# Patient Record
Sex: MALE | Race: WHITE | NOT HISPANIC OR LATINO | Employment: PART TIME | URBAN - METROPOLITAN AREA
[De-identification: names, ages, dates, MRNs, and addresses within clinical notes are randomized per-mention and may not be internally consistent; named-entity substitution may affect disease eponyms.]

---

## 2017-05-20 ENCOUNTER — HOSPITAL ENCOUNTER (EMERGENCY)
Facility: OTHER | Age: 37
Discharge: HOME | End: 2017-05-20
Attending: EMERGENCY MEDICINE

## 2017-05-20 VITALS
HEART RATE: 70 BPM | RESPIRATION RATE: 16 BRPM | OXYGEN SATURATION: 99 % | BODY MASS INDEX: 22.21 KG/M2 | DIASTOLIC BLOOD PRESSURE: 92 MMHG | WEIGHT: 164 LBS | TEMPERATURE: 99 F | HEIGHT: 72 IN | SYSTOLIC BLOOD PRESSURE: 143 MMHG

## 2017-05-20 DIAGNOSIS — S02.19XA: ICD-10-CM

## 2017-05-20 DIAGNOSIS — S02.2XXA CLOSED FRACTURE OF NASAL BONE, INITIAL ENCOUNTER: ICD-10-CM

## 2017-05-20 DIAGNOSIS — S02.32XA CLOSED FRACTURE OF LEFT ORBITAL FLOOR, INITIAL ENCOUNTER: ICD-10-CM

## 2017-05-20 DIAGNOSIS — S82.831A CLOSED FRACTURE OF PROXIMAL END OF RIGHT FIBULA, UNSPECIFIED FRACTURE MORPHOLOGY, INITIAL ENCOUNTER: Primary | ICD-10-CM

## 2017-05-20 PROCEDURE — 99285 EMERGENCY DEPT VISIT HI MDM: CPT | Mod: 25

## 2017-05-20 PROCEDURE — 90715 TDAP VACCINE 7 YRS/> IM: CPT | Performed by: EMERGENCY MEDICINE

## 2017-05-20 PROCEDURE — 25000003 PHARM REV CODE 250: Performed by: EMERGENCY MEDICINE

## 2017-05-20 PROCEDURE — 90471 IMMUNIZATION ADMIN: CPT | Performed by: EMERGENCY MEDICINE

## 2017-05-20 PROCEDURE — 63600175 PHARM REV CODE 636 W HCPCS: Performed by: EMERGENCY MEDICINE

## 2017-05-20 RX ORDER — HYDROCODONE BITARTRATE AND ACETAMINOPHEN 5; 325 MG/1; MG/1
1 TABLET ORAL EVERY 8 HOURS PRN
Qty: 20 TABLET | Refills: 0 | Status: SHIPPED | OUTPATIENT
Start: 2017-05-20 | End: 2017-05-30

## 2017-05-20 RX ORDER — IBUPROFEN 600 MG/1
600 TABLET ORAL EVERY 6 HOURS PRN
Qty: 30 TABLET | Refills: 0 | Status: ON HOLD | OUTPATIENT
Start: 2017-05-20 | End: 2020-06-22 | Stop reason: HOSPADM

## 2017-05-20 RX ORDER — KETOROLAC TROMETHAMINE 30 MG/ML
30 INJECTION, SOLUTION INTRAMUSCULAR; INTRAVENOUS
Status: COMPLETED | OUTPATIENT
Start: 2017-05-20 | End: 2017-05-20

## 2017-05-20 RX ORDER — OXYCODONE AND ACETAMINOPHEN 5; 325 MG/1; MG/1
1 TABLET ORAL
Status: COMPLETED | OUTPATIENT
Start: 2017-05-20 | End: 2017-05-20

## 2017-05-20 RX ORDER — CYCLOBENZAPRINE HCL 10 MG
10 TABLET ORAL 3 TIMES DAILY PRN
Qty: 15 TABLET | Refills: 0 | Status: SHIPPED | OUTPATIENT
Start: 2017-05-20 | End: 2017-05-25

## 2017-05-20 RX ORDER — PROPARACAINE HYDROCHLORIDE 5 MG/ML
2 SOLUTION/ DROPS OPHTHALMIC
Status: COMPLETED | OUTPATIENT
Start: 2017-05-20 | End: 2017-05-20

## 2017-05-20 RX ORDER — MUPIROCIN 20 MG/G
OINTMENT TOPICAL 3 TIMES DAILY
Qty: 1 TUBE | Refills: 0 | Status: SHIPPED | OUTPATIENT
Start: 2017-05-20 | End: 2017-05-30

## 2017-05-20 RX ORDER — PROMETHAZINE HYDROCHLORIDE 25 MG/1
25 TABLET ORAL 2 TIMES DAILY
Qty: 15 TABLET | Refills: 0 | Status: ON HOLD | OUTPATIENT
Start: 2017-05-20 | End: 2020-06-22 | Stop reason: HOSPADM

## 2017-05-20 RX ORDER — PROPARACAINE HYDROCHLORIDE 5 MG/ML
1 SOLUTION/ DROPS OPHTHALMIC
Status: DISCONTINUED | OUTPATIENT
Start: 2017-05-20 | End: 2017-05-20

## 2017-05-20 RX ORDER — AMOXICILLIN AND CLAVULANATE POTASSIUM 875; 125 MG/1; MG/1
1 TABLET, FILM COATED ORAL 2 TIMES DAILY
Qty: 20 TABLET | Refills: 0 | Status: SHIPPED | OUTPATIENT
Start: 2017-05-20 | End: 2017-05-30

## 2017-05-20 RX ORDER — CEFAZOLIN SODIUM 1 G/3ML
1 INJECTION, POWDER, FOR SOLUTION INTRAMUSCULAR; INTRAVENOUS
Status: COMPLETED | OUTPATIENT
Start: 2017-05-20 | End: 2017-05-20

## 2017-05-20 RX ADMIN — CLOSTRIDIUM TETANI TOXOID ANTIGEN (FORMALDEHYDE INACTIVATED), CORYNEBACTERIUM DIPHTHERIAE TOXOID ANTIGEN (FORMALDEHYDE INACTIVATED), BORDETELLA PERTUSSIS TOXOID ANTIGEN (GLUTARALDEHYDE INACTIVATED), BORDETELLA PERTUSSIS FILAMENTOUS HEMAGGLUTININ ANTIGEN (FORMALDEHYDE INACTIVATED), BORDETELLA PERTUSSIS PERTACTIN ANTIGEN, AND BORDETELLA PERTUSSIS FIMBRIAE 2/3 ANTIGEN 0.5 ML: 5; 2; 2.5; 5; 3; 5 INJECTION, SUSPENSION INTRAMUSCULAR at 08:05

## 2017-05-20 RX ADMIN — CEFAZOLIN 1 G: 330 INJECTION, POWDER, FOR SOLUTION INTRAMUSCULAR; INTRAVENOUS at 10:05

## 2017-05-20 RX ADMIN — OXYCODONE AND ACETAMINOPHEN 1 TABLET: 5; 325 TABLET ORAL at 08:05

## 2017-05-20 RX ADMIN — NEOMYCIN-BACITRACIN-POLYMYXIN OINT 1 EACH: OINTMENT at 01:05

## 2017-05-20 RX ADMIN — FLUORESCEIN SODIUM 1 STRIP: 1 STRIP OPHTHALMIC at 10:05

## 2017-05-20 RX ADMIN — PROPARACAINE HYDROCHLORIDE 2 DROP: 5 SOLUTION/ DROPS OPHTHALMIC at 10:05

## 2017-05-20 RX ADMIN — KETOROLAC TROMETHAMINE 30 MG: 30 INJECTION, SOLUTION INTRAMUSCULAR at 12:05
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
## 2017-05-20 NOTE — DISCHARGE INSTRUCTIONS
You must follow-up with ENT, orthopedic surgery, and ophthalmology for further evaluation, treatment, and management of your fractures.      Leg Fracture    You have a break (fracture) of the leg. A fracture is treated with a splint, cast, or special boot. It will take at about 4 to 6 weeks for the fracture to heal. If you have a severe injury, you may need surgery to fix it.  Home care  Follow these guidelines when caring for yourself at home:  · You will be given a splint, cast, boot, or other device to keep the injured area from moving. Unless you were told otherwise, use crutches or a walker. Dont put weight on the injured leg until your health care provider says you can do so. (You can rent crutches and a walker at many pharmacies and surgical or orthopedic supply stores.)  · Keep your leg elevated to reduce pain and swelling. When sleeping, put a pillow under the injured leg. When sitting, support the injured leg so it is level with your waist. This is very important during the first 2 days (48 hours).  · Put an ice pack on the injured area. Do this for 20 minutes every 1 to 2 hours the first day for pain relief. You can make an ice pack by wrapping a plastic bag of ice cubes in a thin towel. As the ice melts, be careful that the cast/splint/boot doesnt get wet. You can put the ice pack directly over the splint or cast. Continue using the ice pack 3 to 4 times a day for the next 2 days. Then use the ice pack as needed to ease pain and swelling.  · Keep the cast/splint/boot completely dry at all times. Bathe with your cast/splint/boot out of the water. Protect it with a large plastic bag, rubber-banded at the top end. If a boot or fiberglass cast or splint gets wet, you can dry it with a hair dryer.  · You may use acetaminophen or ibuprofen to control pain, unless another pain medicine was prescribed. If you have chronic liver or kidney disease, talk with your health care provider before using these  medicines. Also talk with your provider if youve had a stomach ulcer or GI bleeding.  · Dont put creams or objects under the cast if you have itching.  Follow-up care  Follow up with your health care provider in 1 week, or as advised. This is to make sure the bone is healing the way it should. If a splint was put on, it may be converted to a cast at your next visit.  If X-rays were taken, a radiologist will look at them. You will be told of any new findings that may affect your care.  When to seek medical advice  Call your health care provider right away if any of these occur:  · The cast cracks  · The plaster cast or splint becomes wet or soft  · The fiberglass cast or splint stays wet for more than 24 hours  · Bad odor from the cast or wound fluid stains the cast  · Tightness or pain under the cast or splint gets worse  · Toes become swollen, cold, blue, numb, or tingly  · You cant move your toes  · Skin around cast becomes red  · Fever of 101ºF (38.3ºC) or higher, or as directed by your health care provider  Date Last Reviewed: 2/15/2015  © 4240-3848 [employer]. [address] [place] [address]. All rights reserved. This information is not intended as a substitute for professional medical care. Always follow your healthcare professional's instructions.        Nose Fracture, with X-Ray  A broken bone, or fracture, of the nose may be a minor crack. Or it may be a major break, with the parts of your nose pushed out of place. A fractured nose causes pain, swelling, and nasal stuffiness. You may have bleeding from your nose. By tomorrow, you may have bruising around your eyes.  A minor fracture will heal in 3 to 4 weeks, with no more treatment needed. A major break that changes the shape of your nose must be treated by a nose specialist, called an ENT (ear, nose, and throat) doctor.The ENT doctor will straighten the bones in your nose. This is called a reduction. Some fractures may need a  reduction as soon as possible, such as when bleeding from the nose won't stop. Otherwise, it is best to wait a few days until the swelling has gone down. The doctor will then be able to easily see when your nose is back in the right position.    Home care  · Use an ice pack on your nose for no more than 15 to 20 minutes at a time. Do this every 1 to 2 hours for the first 24 to 48 hours. Then use the ice as needed to ease pain and swelling. To make an ice pack, put ice cubes in a plastic bag that seals at the top. Wrap the bag in a clean, thin towel or cloth. Never put ice or an ice pack directly on the skin.  · Tell your provider if you are taking aspirin or blood-thinning medicine. These medicines make it more likely that your nose will bleed. Your provider may need to change your dose.  · You may use over-the-counter pain medicine to control pain, unless another medicine was prescribed. If you have chronic liver or kidney disease, talk with your provider before using this medicine.  · Dont drink alcohol or hot liquids for the next 2 days. Alcohol and hot liquids can dilate blood vessels in your nose. This can cause bleeding.  · Dont blow your nose for the first 2 days. Then, do so gently so you don't cause bleeding.  · Dont play contact sports in the next 6 weeks unless you can protect your nose from getting injured again. You can wear a special custom-fitted plastic face mask to protect your nose.  Special note on concussions  If you had any symptoms of a concussion today, dont return to sports or any activity that could result in another head injury.  These are symptoms of a concussion:  · Nausea  · Vomiting  · Dizziness  · Confusion  · Headache  · Memory loss  · Loss of consciousness  Wait until all of your symptoms are gone and your provider says its OK to resume your activity. Having a second head injury before you fully recover from the first one can lead to serious brain injury.  Follow-up care  Follow  up with your healthcare provider, or as advised. If your nose looks crooked after the swelling goes down, call the ENT doctor for an appointment within the next 10 days. Also make an appointment if its still hard to breathe through 1 or both sides of your nose. If you have trouble getting an ENT appointment, call your regular provider.  If the bones are out of place, a reduction should be done 6 to 10 days after the injury. In children, the reduction should be done 3 to 7 days after the injury. After that time, the bones are more difficult to move back into place.  If you had X-rays taken, you will be told of any new findings that may affect your care.  When to seek medical advice  Call your healthcare provider right away if any of these occur:  · Bleeding from your nose even after you have pinched your nostrils together for 15 minutes without stopping  · Swelling, pain, or redness on your face that gets worse  · Fever of 100.4°F (38°C) or above lasting for 24 to 48 hours  · Can't breathe from both sides of your nose after swelling goes down  · Sinus pain  Call 911  Call 911 if you have:  · Repeated vomiting  · Severe headache or dizziness  · Headache or dizziness that gets worse  · Abnormal drowsiness, or unable to wake up as usual  · Confusion or change in behavior or speech  · Convulsion, or seizure  Date Last Reviewed: 12/3/2015  © 5518-5006 [employer]. [address] [place] [address]. All rights reserved. This information is not intended as a substitute for professional medical care. Always follow your healthcare professional's instructions.        Facial Fracture    You have a broken bone, or fracture, in your face. This may be a small crack in the bone. Or it may be a major break, with the bone moved out of place.  Depending on where the break is, you may have pain when you chew. You may also have nasal congestion, sinus pain, and nose bleeding.   During the first 24 hours after  injury, you may have more swelling or bruising where the break is, or around your eyes. A blow to the face strong enough to cause a broken bone may also cause a concussion or more serious brain injury.  Home care  · Use an ice pack on the injured area for no more than 15 to 20 minutes at a time. Do this every 1 to 2 hours for the first 24 to 48 hours. Then use the ice pack as needed to ease pain and swelling. To make an ice pack, put ice cubes in a plastic bag that seals at the top. Wrap the bag in a clean, thin towel or cloth. Never put ice or an ice pack directly on the skin.  · You may use over-the-counter pain medicine to control pain, unless another pain medicine was prescribed. Talk with your provider before using this medicine if you have chronic liver or kidney disease.  · Sleep with your head raised on 2 or more pillows to ease swelling.  · If you have facial pain when eating, dont eat crunchy or chewy foods. A softer diet will be more comfortable for the first 2 to 3 weeks.  · If you were given antibiotics to prevent an infection, take them as directed until you have finished the prescription.  · If your nose bleeds, sit up and lean forward. Pinch your nostrils together for 10 to 15 minutes. If the bleeding doesnt stop, keep pinching your nostrils and call your healthcare provider. Dont blow your nose for 12 hours after the bleeding stops. This will allow a strong blood clot to form. Dont pick your nose.  Special note on concussions  If you had any symptoms of a concussion today, dont return to sports or any activity that could result in another head injury.  These are symptoms of a concussion:  · Nausea  · Vomiting  · Dizziness  · Confusion  · Headache  · Memory loss  · Loss of consciousness  Wait until all of your symptoms are gone and your provider says its OK to resume your activity. Having a second head injury before you fully recover from the first one can lead to serious brain  "injury.  Follow-up care  Follow up with your healthcare provider in 1 week, or as advised. This is to make sure the bone is healing as it should.  If you had X-rays or CT scans taken, you will be told of any new findings that may affect your care.  When to seek medical advice  Call your healthcare provider right away if any of these occur:  · Swelling or pain in your face that gets worse  · Redness, warmth, or pus draining from the injured area  · Fever of 100.4°F (38°C) or above lasting for 24 to 48 hours  · Double vision  Call 911   Call 911 if you have:  · Repeated vomiting  · Severe headache or dizziness  · Headache or dizziness that gets worse  · Abnormal drowsiness, or unable to wake up as usual  · Confusion or change in behavior or speech  · Convulsion, or seizure   Date Last Reviewed: 12/3/2015  © 6018-1952 [employer]. [address] [place] [address]. All rights reserved. This information is not intended as a substitute for professional medical care. Always follow your healthcare professional's instructions.        Concussion    A concussion can be caused by a direct blow to the head, neck, face, or somewhere else on the body with the force being transmitted to the head. This may cause you to lose consciousness - be "knocked out" - but not always. Depending on the severity of the blow, it will take from a few hours up to a few days to get better. Sometimes symptoms may last a few months or longer. This is called post-concussion syndrome.  At first, you may have a headache, nausea, vomiting, or dizziness. You may also have problems concentrating or remembering things. This is normal.  Symptoms should get better as the hours and days go by. Symptoms that get worse could be a sign of a more serious injury. This might be a bruise or bleeding in the brain. Thats why its important to watch for the warning signs listed below.  Home care  If your injury is mild and there are no serious " signs or symptoms, your healthcare provider may recommend that you be monitored at home. If there is evidence that the injury is more serious, you will be monitored in the hospital. Follow these tips to help care for yourself at home:  · After a concussion, your healthcare provider may recommend that a family member or friend monitor you for 12 to 24 hours. They may be told to wake you every few hours during sleep to check for the signs below.  · If your face or scalp swells, apply an ice pack for 20 minutes every 1 to 2 hours. Do this until the swelling starts to go down. You can make an ice pack by putting ice cubes in a plastic bag and wrapping the bag in a towel.  · You may use acetaminophen to control pain, unless another pain medicine was prescribed. Do not use aspirin or ibuprofen after a head injury. If you have chronic liver or kidney disease, talk with your doctor before using these medicines. Also talk with your doctor if you ever had a stomach ulcer or gastrointestinal bleeding.  · For the next 24 hours:  ¨ Dont drink alcohol or take sedatives or medicines that make you sleepy.  ¨ Dont drive or operate machinery.  ¨ Avoid doing anything strenuous. Dont lift or strain.  · Dont return to sports or any activity that could cause you to hit your head until all symptoms are gone and you have been cleared by your doctor. A second head injury before fully recovering from the first one can lead to serious brain injury.  · Avoid doing activities that require a lot of concentration or a lot of attention. This will allow your brain to rest and heal quicker.  Follow-up care  Follow up with your doctor in 1 week, or as directed.  Note: A radiologist will review any X-rays or CT scans that were taken. You will be told of any new findings that may affect your care.  When to seek medical advice  Call your healthcare provider right away if any of these occur:  · Repeated vomiting  · Headache or dizziness that is  severe or gets worse  · Loss of consciousness  · Unusual drowsiness, or unable to wake up as usual  · Weakness or decreased ability to walk or move any limb  · Confusion, agitation, or change in behavior or speech, or memory loss  · Blurred vision  · Convulsion (seizure)  · Swelling on the scalp or face that gets worse  · Changes in pupil size (the black part of the eye)  · Redness, warmth, or pus from the swollen area  · Fluid draining from or bleeding from the nose or ears     Date Last Reviewed: 8/14/2015  © 7188-7574 [employer]. [address] [place] [address]. All rights reserved. This information is not intended as a substitute for professional medical care. Always follow your healthcare professional's instructions.

## 2017-05-21 ENCOUNTER — HOSPITAL ENCOUNTER (EMERGENCY)
Facility: HOSPITAL | Age: 37
Discharge: HOME | End: 2017-05-21
Attending: EMERGENCY MEDICINE

## 2017-05-21 VITALS
DIASTOLIC BLOOD PRESSURE: 69 MMHG | SYSTOLIC BLOOD PRESSURE: 126 MMHG | BODY MASS INDEX: 22.21 KG/M2 | OXYGEN SATURATION: 96 % | WEIGHT: 164 LBS | HEIGHT: 72 IN | RESPIRATION RATE: 17 BRPM | HEART RATE: 89 BPM

## 2017-05-21 DIAGNOSIS — S82.832A CLOSED FRACTURE OF PROXIMAL END OF LEFT FIBULA, UNSPECIFIED FRACTURE MORPHOLOGY, INITIAL ENCOUNTER: ICD-10-CM

## 2017-05-21 DIAGNOSIS — S02.92XA MULTIPLE FACIAL FRACTURES, CLOSED, INITIAL ENCOUNTER: Primary | ICD-10-CM

## 2017-05-21 PROCEDURE — 99283 EMERGENCY DEPT VISIT LOW MDM: CPT
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
## 2018-08-03 ENCOUNTER — HOSPITAL ENCOUNTER (EMERGENCY)
Facility: HOSPITAL | Age: 38
Discharge: HOME | End: 2018-08-03
Attending: EMERGENCY MEDICINE
Payer: MEDICAID

## 2018-08-03 VITALS
HEART RATE: 68 BPM | OXYGEN SATURATION: 100 % | WEIGHT: 143 LBS | BODY MASS INDEX: 19.37 KG/M2 | RESPIRATION RATE: 16 BRPM | TEMPERATURE: 98 F | HEIGHT: 72 IN | DIASTOLIC BLOOD PRESSURE: 92 MMHG | SYSTOLIC BLOOD PRESSURE: 142 MMHG

## 2018-08-03 DIAGNOSIS — T84.84XA PAINFUL ORTHOPAEDIC HARDWARE: Primary | ICD-10-CM

## 2018-08-03 DIAGNOSIS — M25.579 ANKLE PAIN: ICD-10-CM

## 2018-08-03 PROCEDURE — 25000003 PHARM REV CODE 250: Performed by: EMERGENCY MEDICINE

## 2018-08-03 PROCEDURE — 99283 EMERGENCY DEPT VISIT LOW MDM: CPT | Mod: 25

## 2018-08-03 PROCEDURE — 25000003 PHARM REV CODE 250

## 2018-08-03 RX ORDER — HYDROCODONE BITARTRATE AND ACETAMINOPHEN 5; 325 MG/1; MG/1
1 TABLET ORAL
Status: COMPLETED | OUTPATIENT
Start: 2018-08-03 | End: 2018-08-03

## 2018-08-03 RX ORDER — IBUPROFEN 600 MG/1
TABLET ORAL
Status: COMPLETED
Start: 2018-08-03 | End: 2018-08-03

## 2018-08-03 RX ORDER — IBUPROFEN 600 MG/1
600 TABLET ORAL
Status: COMPLETED | OUTPATIENT
Start: 2018-08-03 | End: 2018-08-03

## 2018-08-03 RX ORDER — HYDROCODONE BITARTRATE AND ACETAMINOPHEN 5; 325 MG/1; MG/1
1 TABLET ORAL NIGHTLY PRN
Qty: 10 TABLET | Refills: 0 | Status: SHIPPED | OUTPATIENT
Start: 2018-08-03 | End: 2018-08-13

## 2018-08-03 RX ADMIN — HYDROCODONE BITARTRATE AND ACETAMINOPHEN 1 TABLET: 5; 325 TABLET ORAL at 04:08

## 2018-08-03 RX ADMIN — IBUPROFEN 600 MG: 600 TABLET ORAL at 02:08

## 2018-08-03 NOTE — LETTER
[address] [facility] [address]  Phone: [phone]  Fax: [phone] July 13, 2019     Christian Fox MD  [address] [facility] [address]    Patient: Truong De La Rosa   Patient ID: [id number]   YOB: 1980   Date of Visit: 8/3/2018        Dear Christian Fox:    Your patient, Truong De La Rosa, was recently seen and treated in our emergency department. Attached to this letter is a summary of that visit.    Sincerely,        Florentino Cagle MD     Enclosure

## 2018-08-03 NOTE — ED PROVIDER NOTES
Encounter Date: 8/3/2018    SCRIBE #1 NOTE: I, Ana Shea, am scribing for, and in the presence of,  Dr. Cagle. I have scribed the entire note.       History     Chief Complaint   Patient presents with    Ankle Pain     pt reports left ankle pain x 2 days. pt reports having left ankle surgery and having screws placed 2 years prior. pt reports taking tylenol yesterday with no relief. pt denies any swelling or recent trauma or injury.     37 y.o male who is a current smoker complains of left ankle pain for 2 days. He had surgery after breaking his left leg 14 months ago and has intermittent pain in the area. He also reports having a plate and screws in his leg. He came into the ED due to his pain being intolerable.His pain is exacerbated with walking or standing. He took tylenol yesterday with no relief. He rates his pain as an 8/10. He denies any recent trauma or injury recently.       The history is provided by the patient.     Review of patient's allergies indicates:   Allergen Reactions    Tramadol Hives     Past Medical History:   Diagnosis Date    Back problem     Scoliosis      History reviewed. No pertinent surgical history.  History reviewed. No pertinent family history.  Social History   Substance Use Topics    Smoking status: Current Every Day Smoker     Packs/day: 1.00    Smokeless tobacco: Never Used    Alcohol use Yes      Comment: occasional     Review of Systems   Constitutional: Negative for chills and fever.   HENT: Negative for sore throat.    Respiratory: Negative for shortness of breath.    Cardiovascular: Negative for chest pain.   Gastrointestinal: Negative for nausea.   Genitourinary: Negative for dysuria.   Musculoskeletal: Positive for arthralgias. Negative for back pain.   Skin: Negative for rash and wound.   Neurological: Negative for weakness.   Hematological: Does not bruise/bleed easily.   All other systems reviewed and are negative.      Physical Exam     Initial Vitals  [08/03/18 1245]   BP Pulse Resp Temp SpO2   127/82 97 18 97.9 °F (36.6 °C) 98 %      MAP       --         Physical Exam    Nursing note and vitals reviewed.  Constitutional: He appears well-developed and well-nourished. He is not diaphoretic. No distress.   HENT:   Head: Normocephalic and atraumatic.   Eyes: EOM are normal.   Cardiovascular: Normal rate, regular rhythm and normal heart sounds. Exam reveals no gallop and no friction rub.    No murmur heard.  Pulses:       Dorsalis pedis pulses are 2+ on the left side.   Pulmonary/Chest: Breath sounds normal. No respiratory distress. He has no wheezes. He has no rhonchi. He has no rales.   Abdominal: Soft.   Musculoskeletal: He exhibits tenderness.        Feet:    Neurological: He is alert and oriented to person, place, and time.   Skin: Skin is warm and dry.         ED Course   Procedures  Labs Reviewed - No data to display       Imaging Results          X-Ray Ankle Complete Left (Final result)  Result time 08/03/18 14:52:40    Final result by Patel Mg MD (08/03/18 14:52:40)                 Impression:      Surgical changes related to fusion of the syndesmosis of the distal tibia and fibula.    Fracture of the more caudal screw traversing the syndesmosis.  Bony lucency along the screws traversing the syndesmosis suggesting persistent motion and/or infection.      Electronically signed by: Patel Mg MD  Date:    08/03/2018  Time:    14:52             Narrative:    EXAMINATION:  XR ANKLE COMPLETE 3 VIEW LEFT    CLINICAL HISTORY:  Pain in unspecified ankle and joints of unspecified foot    TECHNIQUE:  AP, lateral and oblique views of the left ankle were performed.    COMPARISON:  05/20/2017.    FINDINGS:  Since the prior examination there has been placement of a bony plate and 2 screws fusing the syndesmosis of the distal tibia and fibula.  Note that there is a fracture of the more distal metallic screw and there is bony lucency along the screws, most  pronounced along the proximal screw and more lateral aspect of the distal screw.  Findings are most consistent with persistent motion at the syndesmosis with infection considered less likely.  There is no evidence for acute fracture or dislocation.  Soft tissues appear unremarkable.                                 Medical Decision Making:   Clinical Tests:   Radiological Study: Ordered and Reviewed            Scribe Attestation:   Scribe #1: I performed the above scribed service and the documentation accurately describes the services I performed. I attest to the accuracy of the note.               Clinical Impression:     1. Ankle pain    Hardware malfunction    MDM:  I discussed with the patient at length the option of transfer to [facility] at this time which is where he prefers to follow up with orthopedics.  Patient does not wish to be transfered now, but would like to be referred for follow-up and provided with analgesics.  I discussed with him the need for close follow-up with minimal weight-bearing and he agrees.                           Florentino Cagle MD  08/03/18 [id number]       Florentino Cagle MD  08/03/18 2882

## 2019-09-07 ENCOUNTER — HOSPITAL ENCOUNTER (EMERGENCY)
Facility: HOSPITAL | Age: 39
Discharge: HOME | End: 2019-09-07
Attending: EMERGENCY MEDICINE

## 2019-09-07 VITALS
RESPIRATION RATE: 16 BRPM | BODY MASS INDEX: 20.32 KG/M2 | HEART RATE: 79 BPM | OXYGEN SATURATION: 95 % | TEMPERATURE: 98 F | SYSTOLIC BLOOD PRESSURE: 116 MMHG | HEIGHT: 72 IN | DIASTOLIC BLOOD PRESSURE: 78 MMHG | WEIGHT: 150 LBS

## 2019-09-07 DIAGNOSIS — L02.811 SCALP ABSCESS: Primary | ICD-10-CM

## 2019-09-07 PROCEDURE — 25000003 PHARM REV CODE 250: Mod: ER | Performed by: NURSE PRACTITIONER

## 2019-09-07 PROCEDURE — 87070 CULTURE OTHR SPECIMN AEROBIC: CPT

## 2019-09-07 PROCEDURE — 10060 I&D ABSCESS SIMPLE/SINGLE: CPT | Mod: ER

## 2019-09-07 PROCEDURE — 99284 EMERGENCY DEPT VISIT MOD MDM: CPT | Mod: 25,ER

## 2019-09-07 RX ORDER — HYDROCODONE BITARTRATE AND ACETAMINOPHEN 5; 325 MG/1; MG/1
1 TABLET ORAL EVERY 8 HOURS PRN
Qty: 9 TABLET | Refills: 0 | Status: ON HOLD | OUTPATIENT
Start: 2019-09-07 | End: 2020-06-22 | Stop reason: HOSPADM

## 2019-09-07 RX ORDER — HYDROCODONE BITARTRATE AND ACETAMINOPHEN 5; 325 MG/1; MG/1
1 TABLET ORAL
Status: COMPLETED | OUTPATIENT
Start: 2019-09-07 | End: 2019-09-07

## 2019-09-07 RX ORDER — SULFAMETHOXAZOLE AND TRIMETHOPRIM 800; 160 MG/1; MG/1
1 TABLET ORAL 2 TIMES DAILY
Qty: 14 TABLET | Refills: 0 | Status: SHIPPED | OUTPATIENT
Start: 2019-09-07 | End: 2019-09-14

## 2019-09-07 RX ADMIN — HYDROCODONE BITARTRATE AND ACETAMINOPHEN 1 TABLET: 5; 325 TABLET ORAL at 03:09

## 2019-09-07 NOTE — ED NOTES
Pt presents with large abscess to post neck, occipital region midline. States always has a cyst there but worsened over the past 2 days. Reports chills and nausea. Intact, red, raised, indurated. No drainage reported or visualized.

## 2019-09-07 NOTE — ED PROVIDER NOTES
"Encounter Date: 9/7/2019    SCRIBE #1 NOTE: I, Ana Shea, am scribing for, and in the presence of,  Toussaint Battley NP. I have scribed the following portions of the note - Other sections scribed: HPI, ROS, PE .       History     Chief Complaint   Patient presents with    Abscess     pt with abscess to back of neck he reports is recurrent and has been drained a couple times before. He reports it started coming back a couple months ago and the past 2 days " it looks angry"      39 y.o male presents to the ED with swelling and erythema to the posterior neck for several months. He has a hx of abscess in the same area and has to have it drained multiple times. He denies fever, chills, or neck stiffness.     The history is provided by the patient. No  was used.   Abscess    This is a recurrent problem. The current episode started several weeks ago (at least a month ). The problem has been unchanged. The abscess is present on the neck. Pertinent negatives include not drinking less, no fever, no diarrhea, no vomiting, no congestion, no rhinorrhea, no sore throat and no cough.     Review of patient's allergies indicates:   Allergen Reactions    Tramadol Hives     Past Medical History:   Diagnosis Date    Back problem     Scoliosis      Past Surgical History:   Procedure Laterality Date    ANKLE SURGERY       History reviewed. No pertinent family history.  Social History     Tobacco Use    Smoking status: Current Every Day Smoker     Packs/day: 1.00    Smokeless tobacco: Never Used   Substance Use Topics    Alcohol use: Yes     Comment: occasional    Drug use: No     Review of Systems   Constitutional: Negative for activity change, chills, diaphoresis and fever.   HENT: Negative for congestion, drooling, ear pain, rhinorrhea, sneezing, sore throat and trouble swallowing.    Eyes: Negative for pain.   Respiratory: Negative for cough, chest tightness, shortness of breath, wheezing and stridor. "    Cardiovascular: Negative for chest pain, palpitations and leg swelling.   Gastrointestinal: Negative for abdominal distention, abdominal pain, constipation, diarrhea, nausea and vomiting.   Genitourinary: Negative for difficulty urinating, dysuria, frequency and urgency.   Musculoskeletal: Positive for neck pain. Negative for arthralgias, back pain, myalgias and neck stiffness.   Skin: Positive for color change. Negative for pallor, rash and wound.   Neurological: Negative for dizziness, syncope, weakness, light-headedness, numbness and headaches.   All other systems reviewed and are negative.      Physical Exam     Initial Vitals [09/07/19 1356]   BP Pulse Resp Temp SpO2   119/82 79 16 98.5 °F (36.9 °C) 96 %      MAP       --         Physical Exam    Nursing note and vitals reviewed.  Constitutional: He appears well-developed and well-nourished. He is not diaphoretic. No distress.   HENT:   Head: Normocephalic and atraumatic.   Right Ear: External ear normal.   Left Ear: External ear normal.   Eyes: Conjunctivae are normal.   Neck: Normal range of motion and phonation normal. Neck supple.       Pulmonary/Chest: Effort normal. No stridor. No respiratory distress.   Abdominal: Normal appearance.   Musculoskeletal: Normal range of motion. He exhibits no edema.   Neurological: He is alert and oriented to person, place, and time. No cranial nerve deficit or sensory deficit.   Skin: Skin is warm and dry. Capillary refill takes less than 2 seconds.   Psychiatric: He has a normal mood and affect. His behavior is normal.         ED Course   I & D - Incision and Drainage  Date/Time: 9/7/2019 3:23 PM  Location procedure was performed: [facility] EMERGENCY DEPARTMENT  Performed by: Toussaint Battley III, FNP  Authorized by: Sara Bruce DO   Pre-operative diagnosis: abscess  Post-operative diagnosis: abscess  Consent Done: Yes  Consent: Verbal consent obtained.  Risks and benefits: risks, benefits and alternatives were  discussed  Consent given by: patient  Patient understanding: patient states understanding of the procedure being performed  Patient identity confirmed: , MRN, name and verbally with patient  Type: abscess  Body area: head/neck  Location details: scalp    Anesthesia:  Local Anesthetic: lidocaine 1% without epinephrine  Anesthetic total: 3 mL  Patient sedated: no  Scalpel size: 11  Incision type: single straight  Complexity: simple  Drainage: purulent  Drainage amount: copious  Wound treatment: incision,  drainage,  expression of material and  deloculation  Packing material: 1/ in gauze  Complications: No  Estimated blood loss (mL): less than 3 cc.  Specimens: Yes (aerobic culture)  Implants: No  Patient tolerance: Patient tolerated the procedure well with no immediate complications        Labs Reviewed   CULTURE, AEROBIC  (SPECIFY SOURCE)          Imaging Results    None          Medical Decision Making:   Initial Assessment:   Scalp abscess  Differential Diagnosis:   Cyst  ED Management:  The patient will be discharged home on Bactrim and Norco.  Norco x1 given in the ER.  Patient is instructed to return to the ER in 2 days for packing removal or sooner as needed.  Patient verbalized understanding of discharge instructions and treatment plan.            Scribe Attestation:   Scribe #1: I performed the above scribed service and the documentation accurately describes the services I performed. I attest to the accuracy of the note.               Clinical Impression:     1. Scalp abscess                                   Toussaint Battley III, KELSIEP  19 1529

## 2019-09-10 LAB — BACTERIA SPEC AEROBE CULT: NORMAL

## 2020-06-21 ENCOUNTER — HOSPITAL ENCOUNTER (INPATIENT)
Facility: HOSPITAL | Age: 40
LOS: 1 days | Discharge: HOME | DRG: 200 | End: 2020-06-22
Attending: EMERGENCY MEDICINE | Admitting: SURGERY
Payer: MEDICAID
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
## 2020-06-21 DIAGNOSIS — S02.831A CLOSED FRACTURE OF MEDIAL WALL OF RIGHT ORBIT, INITIAL ENCOUNTER: ICD-10-CM

## 2020-06-21 DIAGNOSIS — S01.111A LACERATION OF RIGHT EYEBROW, INITIAL ENCOUNTER: ICD-10-CM

## 2020-06-21 DIAGNOSIS — S27.0XXA TRAUMATIC PNEUMOTHORAX, INITIAL ENCOUNTER: Primary | ICD-10-CM

## 2020-06-21 DIAGNOSIS — S22.42XA CLOSED FRACTURE OF MULTIPLE RIBS OF LEFT SIDE, INITIAL ENCOUNTER: ICD-10-CM

## 2020-06-21 LAB
ALBUMIN SERPL BCP-MCNC: 4 G/DL (ref 3.5–5.2)
ALBUMIN SERPL-MCNC: 4.2 G/DL (ref 3.3–5.5)
ALP SERPL-CCNC: 83 U/L (ref 42–141)
ALP SERPL-CCNC: 96 U/L (ref 55–135)
ALT SERPL W/O P-5'-P-CCNC: 18 U/L (ref 10–44)
AMPHET+METHAMPHET UR QL: NORMAL
ANION GAP SERPL CALC-SCNC: 9 MMOL/L (ref 8–16)
AST SERPL-CCNC: 37 U/L (ref 10–40)
BARBITURATES UR QL SCN>200 NG/ML: NEGATIVE
BASOPHILS # BLD AUTO: 0.06 K/UL (ref 0–0.2)
BASOPHILS NFR BLD: 0.5 % (ref 0–1.9)
BENZODIAZ UR QL SCN>200 NG/ML: NEGATIVE
BILIRUB SERPL-MCNC: 0.9 MG/DL (ref 0.2–1.6)
BILIRUB SERPL-MCNC: 1 MG/DL (ref 0.1–1)
BILIRUBIN, POC UA: NEGATIVE
BLOOD, POC UA: ABNORMAL
BUN SERPL-MCNC: 10 MG/DL (ref 6–20)
BUN SERPL-MCNC: 8 MG/DL (ref 7–22)
BZE UR QL SCN: NEGATIVE
CALCIUM SERPL-MCNC: 9.1 MG/DL (ref 8.7–10.5)
CALCIUM SERPL-MCNC: 9.7 MG/DL (ref 8–10.3)
CANNABINOIDS UR QL SCN: NORMAL
CHLORIDE SERPL-SCNC: 106 MMOL/L (ref 95–110)
CHLORIDE SERPL-SCNC: 109 MMOL/L (ref 98–108)
CLARITY, POC UA: CLEAR
CO2 SERPL-SCNC: 25 MMOL/L (ref 23–29)
COLOR, POC UA: YELLOW
CREAT SERPL-MCNC: 0.8 MG/DL (ref 0.5–1.4)
CREAT SERPL-MCNC: 0.9 MG/DL (ref 0.6–1.2)
CREAT UR-MCNC: 76.8 MG/DL (ref 23–375)
CTP QC/QA: YES
DIFFERENTIAL METHOD: ABNORMAL
EOSINOPHIL # BLD AUTO: 0 K/UL (ref 0–0.5)
EOSINOPHIL NFR BLD: 0.2 % (ref 0–8)
ERYTHROCYTE [DISTWIDTH] IN BLOOD BY AUTOMATED COUNT: 13.7 % (ref 11.5–14.5)
EST. GFR  (AFRICAN AMERICAN): >60 ML/MIN/1.73 M^2
EST. GFR  (NON AFRICAN AMERICAN): >60 ML/MIN/1.73 M^2
ETHANOL SERPL-MCNC: 21 MG/DL
GLUCOSE SERPL-MCNC: 76 MG/DL (ref 70–110)
GLUCOSE SERPL-MCNC: 88 MG/DL (ref 73–118)
GLUCOSE, POC UA: NEGATIVE
HCT VFR BLD AUTO: 45 % (ref 40–54)
HGB BLD-MCNC: 14.4 G/DL (ref 14–18)
IMM GRANULOCYTES # BLD AUTO: 0.03 K/UL (ref 0–0.04)
IMM GRANULOCYTES NFR BLD AUTO: 0.3 % (ref 0–0.5)
KETONES, POC UA: ABNORMAL
LEUKOCYTE EST, POC UA: NEGATIVE
LYMPHOCYTES # BLD AUTO: 2 K/UL (ref 1–4.8)
LYMPHOCYTES NFR BLD: 17.7 % (ref 18–48)
MAGNESIUM SERPL-MCNC: 1.7 MG/DL (ref 1.6–2.6)
MCH RBC QN AUTO: 29.5 PG (ref 27–31)
MCHC RBC AUTO-ENTMCNC: 32 G/DL (ref 32–36)
MCV RBC AUTO: 92 FL (ref 82–98)
METHADONE UR QL SCN>300 NG/ML: NEGATIVE
MONOCYTES # BLD AUTO: 0.6 K/UL (ref 0.3–1)
MONOCYTES NFR BLD: 5.7 % (ref 4–15)
NEUTROPHILS # BLD AUTO: 8.4 K/UL (ref 1.8–7.7)
NEUTROPHILS NFR BLD: 75.6 % (ref 38–73)
NITRITE, POC UA: NEGATIVE
NRBC BLD-RTO: 0 /100 WBC
OPIATES UR QL SCN: NEGATIVE
PCP UR QL SCN>25 NG/ML: NEGATIVE
PH UR STRIP: 5 [PH]
PHOSPHATE SERPL-MCNC: 4 MG/DL (ref 2.7–4.5)
PLATELET # BLD AUTO: 183 K/UL (ref 150–350)
PMV BLD AUTO: 9.4 FL (ref 9.2–12.9)
POC ALT (SGPT): 20 U/L (ref 10–47)
POC AST (SGOT): 47 U/L (ref 11–38)
POC PTINR: 1 (ref 0.9–1.2)
POC PTWBT: 12.2 SEC (ref 9.7–14.3)
POC TCO2: 26 MMOL/L (ref 18–33)
POTASSIUM BLD-SCNC: 4.9 MMOL/L (ref 3.6–5.1)
POTASSIUM SERPL-SCNC: 4.1 MMOL/L (ref 3.5–5.1)
PROT SERPL-MCNC: 6.6 G/DL (ref 6–8.4)
PROTEIN, POC UA: NEGATIVE
PROTEIN, POC: 7.3 G/DL (ref 6.4–8.1)
RBC # BLD AUTO: 4.88 M/UL (ref 4.6–6.2)
SAMPLE: NORMAL
SARS-COV-2 RDRP RESP QL NAA+PROBE: NEGATIVE
SODIUM BLD-SCNC: 141 MMOL/L (ref 128–145)
SODIUM SERPL-SCNC: 140 MMOL/L (ref 136–145)
SPECIFIC GRAVITY, POC UA: 1.01
TOXICOLOGY INFORMATION: NORMAL
UROBILINOGEN, POC UA: 0.2 E.U./DL
WBC # BLD AUTO: 11.15 K/UL (ref 3.9–12.7)

## 2020-06-21 PROCEDURE — 36415 COLL VENOUS BLD VENIPUNCTURE: CPT

## 2020-06-21 PROCEDURE — 84100 ASSAY OF PHOSPHORUS: CPT

## 2020-06-21 PROCEDURE — 27000646 HC AEROBIKA DEVICE

## 2020-06-21 PROCEDURE — 83735 ASSAY OF MAGNESIUM: CPT

## 2020-06-21 PROCEDURE — 25000003 PHARM REV CODE 250: Mod: ER | Performed by: EMERGENCY MEDICINE

## 2020-06-21 PROCEDURE — 25000003 PHARM REV CODE 250: Performed by: STUDENT IN AN ORGANIZED HEALTH CARE EDUCATION/TRAINING PROGRAM

## 2020-06-21 PROCEDURE — 96376 TX/PRO/DX INJ SAME DRUG ADON: CPT | Mod: ER

## 2020-06-21 PROCEDURE — 80307 DRUG TEST PRSMV CHEM ANLYZR: CPT

## 2020-06-21 PROCEDURE — 96365 THER/PROPH/DIAG IV INF INIT: CPT | Mod: ER

## 2020-06-21 PROCEDURE — 85610 PROTHROMBIN TIME: CPT | Mod: ER

## 2020-06-21 PROCEDURE — 96375 TX/PRO/DX INJ NEW DRUG ADDON: CPT | Mod: ER

## 2020-06-21 PROCEDURE — 11000001 HC ACUTE MED/SURG PRIVATE ROOM

## 2020-06-21 PROCEDURE — 12011 RPR F/E/E/N/L/M 2.5 CM/<: CPT | Mod: ER

## 2020-06-21 PROCEDURE — 85025 COMPLETE CBC W/AUTO DIFF WBC: CPT

## 2020-06-21 PROCEDURE — 63600175 PHARM REV CODE 636 W HCPCS: Mod: ER | Performed by: EMERGENCY MEDICINE

## 2020-06-21 PROCEDURE — 63600175 PHARM REV CODE 636 W HCPCS: Performed by: STUDENT IN AN ORGANIZED HEALTH CARE EDUCATION/TRAINING PROGRAM

## 2020-06-21 PROCEDURE — U0002 COVID-19 LAB TEST NON-CDC: HCPCS | Mod: ER | Performed by: EMERGENCY MEDICINE

## 2020-06-21 PROCEDURE — 94761 N-INVAS EAR/PLS OXIMETRY MLT: CPT

## 2020-06-21 PROCEDURE — 94799 UNLISTED PULMONARY SVC/PX: CPT

## 2020-06-21 PROCEDURE — 94664 DEMO&/EVAL PT USE INHALER: CPT

## 2020-06-21 PROCEDURE — 25500020 PHARM REV CODE 255: Mod: ER | Performed by: EMERGENCY MEDICINE

## 2020-06-21 PROCEDURE — 99285 EMERGENCY DEPT VISIT HI MDM: CPT | Mod: 25,ER

## 2020-06-21 PROCEDURE — 99900035 HC TECH TIME PER 15 MIN (STAT)

## 2020-06-21 PROCEDURE — 80053 COMPREHEN METABOLIC PANEL: CPT

## 2020-06-21 PROCEDURE — 87040 BLOOD CULTURE FOR BACTERIA: CPT

## 2020-06-21 PROCEDURE — 80320 DRUG SCREEN QUANTALCOHOLS: CPT

## 2020-06-21 PROCEDURE — 81003 URINALYSIS AUTO W/O SCOPE: CPT | Mod: ER

## 2020-06-21 PROCEDURE — 80053 COMPREHEN METABOLIC PANEL: CPT | Mod: ER

## 2020-06-21 PROCEDURE — 85025 COMPLETE CBC W/AUTO DIFF WBC: CPT | Mod: ER

## 2020-06-21 RX ORDER — HYDROMORPHONE HYDROCHLORIDE 1 MG/ML
0.5 INJECTION, SOLUTION INTRAMUSCULAR; INTRAVENOUS; SUBCUTANEOUS EVERY 4 HOURS PRN
Status: DISCONTINUED | OUTPATIENT
Start: 2020-06-21 | End: 2020-06-22 | Stop reason: HOSPADM

## 2020-06-21 RX ORDER — HYDROMORPHONE HYDROCHLORIDE 2 MG/ML
1 INJECTION, SOLUTION INTRAMUSCULAR; INTRAVENOUS; SUBCUTANEOUS
Status: COMPLETED | OUTPATIENT
Start: 2020-06-21 | End: 2020-06-21

## 2020-06-21 RX ORDER — HYDROMORPHONE HYDROCHLORIDE 1 MG/ML
1 INJECTION, SOLUTION INTRAMUSCULAR; INTRAVENOUS; SUBCUTANEOUS EVERY 4 HOURS PRN
Status: DISCONTINUED | OUTPATIENT
Start: 2020-06-21 | End: 2020-06-21

## 2020-06-21 RX ORDER — LANOLIN ALCOHOL/MO/W.PET/CERES
400 CREAM (GRAM) TOPICAL ONCE
Status: COMPLETED | OUTPATIENT
Start: 2020-06-21 | End: 2020-06-21

## 2020-06-21 RX ORDER — ACETAMINOPHEN 325 MG/1
650 TABLET ORAL EVERY 8 HOURS
Status: DISCONTINUED | OUTPATIENT
Start: 2020-06-21 | End: 2020-06-22 | Stop reason: HOSPADM

## 2020-06-21 RX ORDER — METOCLOPRAMIDE HYDROCHLORIDE 5 MG/ML
5 INJECTION INTRAMUSCULAR; INTRAVENOUS EVERY 6 HOURS PRN
Status: DISCONTINUED | OUTPATIENT
Start: 2020-06-21 | End: 2020-06-22 | Stop reason: HOSPADM

## 2020-06-21 RX ORDER — ACETAMINOPHEN 325 MG/1
650 TABLET ORAL EVERY 8 HOURS PRN
Status: DISCONTINUED | OUTPATIENT
Start: 2020-06-21 | End: 2020-06-22 | Stop reason: HOSPADM

## 2020-06-21 RX ORDER — OXYCODONE HYDROCHLORIDE 5 MG/1
5 TABLET ORAL EVERY 4 HOURS PRN
Status: DISCONTINUED | OUTPATIENT
Start: 2020-06-21 | End: 2020-06-22 | Stop reason: HOSPADM

## 2020-06-21 RX ORDER — GABAPENTIN 300 MG/1
300 CAPSULE ORAL 3 TIMES DAILY
Status: DISCONTINUED | OUTPATIENT
Start: 2020-06-21 | End: 2020-06-22 | Stop reason: HOSPADM

## 2020-06-21 RX ORDER — ONDANSETRON 2 MG/ML
4 INJECTION INTRAMUSCULAR; INTRAVENOUS EVERY 6 HOURS PRN
Status: DISCONTINUED | OUTPATIENT
Start: 2020-06-21 | End: 2020-06-22 | Stop reason: HOSPADM

## 2020-06-21 RX ORDER — FENTANYL CITRATE 50 UG/ML
50 INJECTION, SOLUTION INTRAMUSCULAR; INTRAVENOUS
Status: COMPLETED | OUTPATIENT
Start: 2020-06-21 | End: 2020-06-21

## 2020-06-21 RX ORDER — HYDROMORPHONE HYDROCHLORIDE 1 MG/ML
0.5 INJECTION, SOLUTION INTRAMUSCULAR; INTRAVENOUS; SUBCUTANEOUS EVERY 4 HOURS PRN
Status: DISCONTINUED | OUTPATIENT
Start: 2020-06-21 | End: 2020-06-21

## 2020-06-21 RX ORDER — SODIUM CHLORIDE 0.9 % (FLUSH) 0.9 %
10 SYRINGE (ML) INJECTION
Status: DISCONTINUED | OUTPATIENT
Start: 2020-06-21 | End: 2020-06-22 | Stop reason: HOSPADM

## 2020-06-21 RX ORDER — OXYCODONE HYDROCHLORIDE 10 MG/1
10 TABLET ORAL EVERY 4 HOURS PRN
Status: DISCONTINUED | OUTPATIENT
Start: 2020-06-21 | End: 2020-06-22 | Stop reason: HOSPADM

## 2020-06-21 RX ORDER — SODIUM CHLORIDE, SODIUM LACTATE, POTASSIUM CHLORIDE, CALCIUM CHLORIDE 600; 310; 30; 20 MG/100ML; MG/100ML; MG/100ML; MG/100ML
INJECTION, SOLUTION INTRAVENOUS CONTINUOUS
Status: DISCONTINUED | OUTPATIENT
Start: 2020-06-21 | End: 2020-06-22 | Stop reason: HOSPADM

## 2020-06-21 RX ADMIN — SODIUM CHLORIDE 1000 ML: 0.9 INJECTION, SOLUTION INTRAVENOUS at 11:06

## 2020-06-21 RX ADMIN — FENTANYL CITRATE 50 MCG: 50 INJECTION, SOLUTION INTRAMUSCULAR; INTRAVENOUS at 08:06

## 2020-06-21 RX ADMIN — CEFTRIAXONE 1 G: 1 INJECTION, SOLUTION INTRAVENOUS at 09:06

## 2020-06-21 RX ADMIN — SODIUM CHLORIDE 1000 ML: 0.9 INJECTION, SOLUTION INTRAVENOUS at 09:06

## 2020-06-21 RX ADMIN — FENTANYL CITRATE 50 MCG: 50 INJECTION, SOLUTION INTRAMUSCULAR; INTRAVENOUS at 09:06

## 2020-06-21 RX ADMIN — HYDROMORPHONE HYDROCHLORIDE 1 MG: 2 INJECTION INTRAMUSCULAR; INTRAVENOUS; SUBCUTANEOUS at 11:06

## 2020-06-21 RX ADMIN — GABAPENTIN 300 MG: 300 CAPSULE ORAL at 08:06

## 2020-06-21 RX ADMIN — IOHEXOL 75 ML: 350 INJECTION, SOLUTION INTRAVENOUS at 08:06

## 2020-06-21 RX ADMIN — HYDROMORPHONE HYDROCHLORIDE 1 MG: 1 INJECTION, SOLUTION INTRAMUSCULAR; INTRAVENOUS; SUBCUTANEOUS at 04:06

## 2020-06-21 RX ADMIN — Medication 400 MG: at 06:06

## 2020-06-21 RX ADMIN — ACETAMINOPHEN 650 MG: 325 TABLET ORAL at 08:06

## 2020-06-21 RX ADMIN — OXYCODONE HYDROCHLORIDE 10 MG: 10 TABLET ORAL at 08:06

## 2020-06-21 RX ADMIN — SODIUM CHLORIDE, SODIUM LACTATE, POTASSIUM CHLORIDE, AND CALCIUM CHLORIDE: .6; .31; .03; .02 INJECTION, SOLUTION INTRAVENOUS at 05:06
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
## 2020-06-21 NOTE — ED NOTES
Pt resting without new complaint--resp unlabored at present--neuro intact--states that pain is beginning to return--MD notified
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
## 2020-06-21 NOTE — ED NOTES
[facility] contacted in ref to room to be assigned, states pt is on bedboard, waiting for room assignment

## 2020-06-21 NOTE — SUBJECTIVE & OBJECTIVE
No current facility-administered medications on file prior to encounter.      Current Outpatient Medications on File Prior to Encounter   Medication Sig    HYDROcodone-acetaminophen (NORCO) 5-325 mg per tablet Take 1 tablet by mouth every 8 (eight) hours as needed for Pain.    ibuprofen (ADVIL,MOTRIN) 600 MG tablet Take 1 tablet (600 mg total) by mouth every 6 (six) hours as needed for Pain (Take with food as needed for mild-to-moderate pain).    promethazine (PHENERGAN) 25 MG tablet Take 1 tablet (25 mg total) by mouth 2 (two) times daily.       Review of patient's allergies indicates:   Allergen Reactions    Tramadol Hives    Poison ivy [vit e-nonoxynol 9-aloe vera]        Past Medical History:   Diagnosis Date    Back problem     Scoliosis      Past Surgical History:   Procedure Laterality Date    ANKLE SURGERY       Family History     None        Tobacco Use    Smoking status: Current Every Day Smoker     Packs/day: 1.00    Smokeless tobacco: Never Used   Substance and Sexual Activity    Alcohol use: Yes     Comment: occasional    Drug use: No    Sexual activity: Not on file     Review of Systems   Constitutional: Negative for chills and fever.   HENT: Positive for facial swelling. Negative for hearing loss and voice change.    Eyes: Negative for discharge and redness.   Respiratory: Positive for chest tightness. Negative for cough and shortness of breath.         Pain with deep breaths   Cardiovascular: Positive for chest pain (Left chestwall).   Gastrointestinal: Negative for abdominal pain, diarrhea, nausea and vomiting.   Genitourinary: Negative for difficulty urinating and hematuria.   Musculoskeletal: Negative for gait problem.   Skin: Negative for pallor.        R periorbital ecchymosis   Neurological: Negative for dizziness and weakness.   Psychiatric/Behavioral: Negative for behavioral problems.     Objective:     Vital Signs (Most Recent):  Temp: 98.1 °F (36.7 °C) (06/21/20 1331)  Pulse:  75 (06/21/20 1346)  Resp: 20 (06/21/20 1346)  BP: (!) 140/87 (06/21/20 1346)  SpO2: 100 % (06/21/20 1346) Vital Signs (24h Range):  Temp:  [97.4 °F (36.3 °C)-98.1 °F (36.7 °C)] 98.1 °F (36.7 °C)  Pulse:  [] 75  Resp:  [14-24] 20  SpO2:  [96 %-100 %] 100 %  BP: (113-151)/(72-98) 140/87     Weight: 65.8 kg (145 lb)  Body mass index is 19.67 kg/m².    Physical Exam  Constitutional:       General: He is not in acute distress.     Appearance: He is well-developed.   HENT:      Head: Normocephalic.      Comments: Tender to palpation of R face  R periorbital swelling and ecchymosis  Small abrasions to forehead     Mouth/Throat:      Mouth: Mucous membranes are dry.   Eyes:      General:         Right eye: No discharge.         Left eye: No discharge.      Extraocular Movements: Extraocular movements intact.   Neck:      Musculoskeletal: Normal range of motion and neck supple.   Cardiovascular:      Rate and Rhythm: Normal rate and regular rhythm.   Pulmonary:      Effort: No respiratory distress.      Breath sounds: No stridor. No wheezing or rales.   Chest:      Chest wall: Tenderness (Left chest) present.   Abdominal:      General: There is no distension.      Palpations: Abdomen is soft.      Tenderness: There is no abdominal tenderness.   Musculoskeletal: Normal range of motion.         General: No deformity.   Skin:     General: Skin is warm and dry.      Findings: Bruising (R periorbital) present.   Neurological:      General: No focal deficit present.      Mental Status: He is alert and oriented to person, place, and time.      Comments: Intact sensation and motor function all four extremities   Psychiatric:         Mood and Affect: Mood normal.         Behavior: Behavior normal.         Significant Labs:  CBC:   Recent Labs   Lab 06/21/20  1543   WBC 11.15   RBC 4.88   HGB 14.4   HCT 45.0      MCV 92   MCH 29.5   MCHC 32.0     CMP:   Recent Labs   Lab 06/21/20  1543   GLU 76   CALCIUM 9.1   ALBUMIN 4.0    PROT 6.6      K 4.1   CO2 25      BUN 10   CREATININE 0.8   ALKPHOS 96   ALT 18   AST 37   BILITOT 1.0       Significant Diagnostics:  I have reviewed all pertinent imaging results/findings within the past 24 hours.     CT Maxillofacial, CT C spine, CT Head 6/21/20  Small, acute, minimally displaced right medial orbital wall fracture..     Remote left medial orbital wall fracture.     Extensive extracranial soft tissue swelling without evidence of displaced calvarial fracture or acute intracranial hemorrhage.     No fracture or traumatic malalignment in the cervical spine.     Small left apical pneumothorax.    CT C/A/P 6/21/20  Examination mildly degraded by patient motion artifact.     Acute fractures of the left 7th and 8th ribs with small associated pneumothorax.     No acute intra-abdominal pathology identified.     L5 pars defects with associated anterolisthesis at L5-S1.
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
## 2020-06-22 VITALS
OXYGEN SATURATION: 100 % | HEART RATE: 77 BPM | SYSTOLIC BLOOD PRESSURE: 140 MMHG | BODY MASS INDEX: 18.36 KG/M2 | HEIGHT: 72 IN | RESPIRATION RATE: 16 BRPM | TEMPERATURE: 98 F | WEIGHT: 135.56 LBS | DIASTOLIC BLOOD PRESSURE: 85 MMHG

## 2020-06-22 PROBLEM — S02.839A MEDIAL ORBITAL WALL FRACTURE: Status: ACTIVE | Noted: 2020-06-22

## 2020-06-22 LAB
ALBUMIN SERPL BCP-MCNC: 3.6 G/DL (ref 3.5–5.2)
ALP SERPL-CCNC: 90 U/L (ref 55–135)
ALT SERPL W/O P-5'-P-CCNC: 17 U/L (ref 10–44)
ANION GAP SERPL CALC-SCNC: 9 MMOL/L (ref 8–16)
AST SERPL-CCNC: 34 U/L (ref 10–40)
BASOPHILS # BLD AUTO: 0.05 K/UL (ref 0–0.2)
BASOPHILS NFR BLD: 0.5 % (ref 0–1.9)
BILIRUB SERPL-MCNC: 1 MG/DL (ref 0.1–1)
BUN SERPL-MCNC: 13 MG/DL (ref 6–20)
CALCIUM SERPL-MCNC: 8.6 MG/DL (ref 8.7–10.5)
CHLORIDE SERPL-SCNC: 104 MMOL/L (ref 95–110)
CO2 SERPL-SCNC: 26 MMOL/L (ref 23–29)
CREAT SERPL-MCNC: 0.8 MG/DL (ref 0.5–1.4)
DIFFERENTIAL METHOD: ABNORMAL
EOSINOPHIL # BLD AUTO: 0.1 K/UL (ref 0–0.5)
EOSINOPHIL NFR BLD: 0.7 % (ref 0–8)
ERYTHROCYTE [DISTWIDTH] IN BLOOD BY AUTOMATED COUNT: 13.8 % (ref 11.5–14.5)
EST. GFR  (AFRICAN AMERICAN): >60 ML/MIN/1.73 M^2
EST. GFR  (NON AFRICAN AMERICAN): >60 ML/MIN/1.73 M^2
GLUCOSE SERPL-MCNC: 75 MG/DL (ref 70–110)
HCT VFR BLD AUTO: 44 % (ref 40–54)
HGB BLD-MCNC: 14.6 G/DL (ref 14–18)
IMM GRANULOCYTES # BLD AUTO: 0.02 K/UL (ref 0–0.04)
IMM GRANULOCYTES NFR BLD AUTO: 0.2 % (ref 0–0.5)
LYMPHOCYTES # BLD AUTO: 1.4 K/UL (ref 1–4.8)
LYMPHOCYTES NFR BLD: 14.2 % (ref 18–48)
MAGNESIUM SERPL-MCNC: 1.8 MG/DL (ref 1.6–2.6)
MCH RBC QN AUTO: 29.7 PG (ref 27–31)
MCHC RBC AUTO-ENTMCNC: 33.2 G/DL (ref 32–36)
MCV RBC AUTO: 89 FL (ref 82–98)
MONOCYTES # BLD AUTO: 0.7 K/UL (ref 0.3–1)
MONOCYTES NFR BLD: 6.6 % (ref 4–15)
NEUTROPHILS # BLD AUTO: 7.7 K/UL (ref 1.8–7.7)
NEUTROPHILS NFR BLD: 77.8 % (ref 38–73)
NRBC BLD-RTO: 0 /100 WBC
PHOSPHATE SERPL-MCNC: 2.5 MG/DL (ref 2.7–4.5)
PLATELET # BLD AUTO: 198 K/UL (ref 150–350)
PMV BLD AUTO: 10.5 FL (ref 9.2–12.9)
POTASSIUM SERPL-SCNC: 4.1 MMOL/L (ref 3.5–5.1)
PROT SERPL-MCNC: 6.1 G/DL (ref 6–8.4)
RBC # BLD AUTO: 4.92 M/UL (ref 4.6–6.2)
SODIUM SERPL-SCNC: 139 MMOL/L (ref 136–145)
WBC # BLD AUTO: 9.93 K/UL (ref 3.9–12.7)

## 2020-06-22 PROCEDURE — 99499 NO LOS: ICD-10-PCS | Mod: ,,, | Performed by: OTOLARYNGOLOGY

## 2020-06-22 PROCEDURE — 36415 COLL VENOUS BLD VENIPUNCTURE: CPT

## 2020-06-22 PROCEDURE — 94799 UNLISTED PULMONARY SVC/PX: CPT

## 2020-06-22 PROCEDURE — 25000003 PHARM REV CODE 250: Performed by: STUDENT IN AN ORGANIZED HEALTH CARE EDUCATION/TRAINING PROGRAM

## 2020-06-22 PROCEDURE — 80053 COMPREHEN METABOLIC PANEL: CPT

## 2020-06-22 PROCEDURE — 27000646 HC AEROBIKA DEVICE

## 2020-06-22 PROCEDURE — 99221 1ST HOSP IP/OBS SF/LOW 40: CPT | Mod: ,,, | Performed by: SURGERY

## 2020-06-22 PROCEDURE — 99221 PR INITIAL HOSPITAL CARE,LEVL I: ICD-10-PCS | Mod: ,,, | Performed by: SURGERY

## 2020-06-22 PROCEDURE — 94664 DEMO&/EVAL PT USE INHALER: CPT

## 2020-06-22 PROCEDURE — 85025 COMPLETE CBC W/AUTO DIFF WBC: CPT

## 2020-06-22 PROCEDURE — 99900035 HC TECH TIME PER 15 MIN (STAT)

## 2020-06-22 PROCEDURE — 83735 ASSAY OF MAGNESIUM: CPT

## 2020-06-22 PROCEDURE — 27000221 HC OXYGEN, UP TO 24 HOURS

## 2020-06-22 PROCEDURE — 94761 N-INVAS EAR/PLS OXIMETRY MLT: CPT

## 2020-06-22 PROCEDURE — 84100 ASSAY OF PHOSPHORUS: CPT

## 2020-06-22 PROCEDURE — 99499 UNLISTED E&M SERVICE: CPT | Mod: ,,, | Performed by: OTOLARYNGOLOGY

## 2020-06-22 PROCEDURE — 63600175 PHARM REV CODE 636 W HCPCS: Performed by: STUDENT IN AN ORGANIZED HEALTH CARE EDUCATION/TRAINING PROGRAM

## 2020-06-22 RX ORDER — METHOCARBAMOL 500 MG/1
500 TABLET, FILM COATED ORAL 4 TIMES DAILY
Qty: 40 TABLET | Refills: 0 | Status: SHIPPED | OUTPATIENT
Start: 2020-06-22 | End: 2020-07-02

## 2020-06-22 RX ORDER — GABAPENTIN 300 MG/1
300 CAPSULE ORAL 3 TIMES DAILY
Qty: 90 CAPSULE | Refills: 11 | Status: SHIPPED | OUTPATIENT
Start: 2020-06-22 | End: 2021-06-22

## 2020-06-22 RX ORDER — IBUPROFEN 400 MG/1
400 TABLET ORAL EVERY 6 HOURS PRN
COMMUNITY
Start: 2020-06-22

## 2020-06-22 RX ORDER — OXYCODONE HYDROCHLORIDE 10 MG/1
10 TABLET ORAL EVERY 4 HOURS PRN
Qty: 25 TABLET | Refills: 0 | Status: SHIPPED | OUTPATIENT
Start: 2020-06-22

## 2020-06-22 RX ORDER — ACETAMINOPHEN 500 MG
500 TABLET ORAL EVERY 6 HOURS PRN
Refills: 0 | COMMUNITY
Start: 2020-06-22

## 2020-06-22 RX ADMIN — ACETAMINOPHEN 650 MG: 325 TABLET ORAL at 04:06

## 2020-06-22 RX ADMIN — GABAPENTIN 300 MG: 300 CAPSULE ORAL at 08:06

## 2020-06-22 RX ADMIN — SODIUM CHLORIDE, SODIUM LACTATE, POTASSIUM CHLORIDE, AND CALCIUM CHLORIDE: .6; .31; .03; .02 INJECTION, SOLUTION INTRAVENOUS at 09:06

## 2020-06-22 RX ADMIN — OXYCODONE HYDROCHLORIDE 10 MG: 10 TABLET ORAL at 12:06

## 2020-06-22 RX ADMIN — OXYCODONE HYDROCHLORIDE 10 MG: 10 TABLET ORAL at 09:06

## 2020-06-22 RX ADMIN — OXYCODONE HYDROCHLORIDE 10 MG: 10 TABLET ORAL at 04:06
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
## 2020-06-22 NOTE — SUBJECTIVE & OBJECTIVE
Medications:  Continuous Infusions:   lactated ringers 100 mL/hr at 06/21/20 1702     Scheduled Meds:   acetaminophen  650 mg Oral Q8H    gabapentin  300 mg Oral TID     PRN Meds:acetaminophen, HYDROmorphone, metoclopramide HCl, ondansetron, oxyCODONE, oxyCODONE, sodium chloride 0.9%     No current facility-administered medications on file prior to encounter.      Current Outpatient Medications on File Prior to Encounter   Medication Sig    HYDROcodone-acetaminophen (NORCO) 5-325 mg per tablet Take 1 tablet by mouth every 8 (eight) hours as needed for Pain.    ibuprofen (ADVIL,MOTRIN) 600 MG tablet Take 1 tablet (600 mg total) by mouth every 6 (six) hours as needed for Pain (Take with food as needed for mild-to-moderate pain).    promethazine (PHENERGAN) 25 MG tablet Take 1 tablet (25 mg total) by mouth 2 (two) times daily.       Review of patient's allergies indicates:   Allergen Reactions    Tramadol Hives    Poison ivy [vit e-nonoxynol 9-aloe vera]        Past Medical History:   Diagnosis Date    Back problem     Scoliosis      Past Surgical History:   Procedure Laterality Date    ANKLE SURGERY       Family History     None        Tobacco Use    Smoking status: Current Every Day Smoker     Packs/day: 1.00    Smokeless tobacco: Never Used   Substance and Sexual Activity    Alcohol use: Yes     Comment: occasional    Drug use: No    Sexual activity: Not on file     Review of Systems   Constitutional: Negative for fever.   HENT: Positive for facial swelling. Negative for hearing loss, nosebleeds, rhinorrhea, sore throat, tinnitus and trouble swallowing.    Eyes: Negative for photophobia, pain, redness and visual disturbance.   Respiratory: Positive for chest tightness.    Cardiovascular: Negative.    Gastrointestinal: Negative.    Endocrine: Negative.    Genitourinary: Negative.    Musculoskeletal: Negative for neck pain and neck stiffness.   Neurological: Negative.    Hematological: Negative.       Objective:     Vital Signs (Most Recent):  Temp: 97.9 °F (36.6 °C) (06/22/20 0438)  Pulse: (!) 55 (06/22/20 0438)  Resp: 18 (06/22/20 0438)  BP: 128/83 (06/22/20 0438)  SpO2: 98 % (06/22/20 0438) Vital Signs (24h Range):  Temp:  [97.4 °F (36.3 °C)-98.6 °F (37 °C)] 97.9 °F (36.6 °C)  Pulse:  [] 55  Resp:  [14-24] 18  SpO2:  [96 %-100 %] 98 %  BP: (113-166)/(72-98) 128/83     Weight: 61.5 kg (135 lb 9.3 oz)  Body mass index is 18.39 kg/m².        Physical Exam  Physical Exam    Vitals:    06/22/20 0438   BP: 128/83   Pulse: (!) 55   Resp: 18   Temp: 97.9 °F (36.6 °C)       General: AAOx3, NAD.  Right Ear: External Auditory Canal WNL,TM w/o masses/lesions/perforations/effusions.  Left Ear:  External Auditory Canal WNL,TM w/o masses/lesions/perforations/effusions.  Nose: No gross nasal septal deviation.  Inferior Turbinates WNL bilaterally.  No septal perforation or hematomas  No masses/lesions.  Oral Cavity: FOM Soft, no masses palpated. Oral Tongue mobile. Hard Palate WNL.  Oropharynx: BOT WNL.  No masses/lesions noted. Tonsillar fossa without lesions. Soft palate without masses. Midline uvula.  Neck: No palpable lymphadenopathy at levels I - VI. Full ROM. No thyroid nodules palpated.  Face: HB I bilaterally. Normal sensation.  Eyes: right infraorbital ecchymosis. Right supraorbital superficial lac covered in dermabond. No bony step offs. EOM intact bilaterally, PERRLA bilaterally. Visual acuity grossly intact. No visual field deficits. No exophthalmos/enopthalmos.  Neuro: CN II-XII grossly intact      Significant Labs:  All pertinent labs from the last 24 hours have been reviewed.    Significant Diagnostics:  I have reviewed and interpreted all pertinent imaging results/findings within the past 24 hours.

## 2020-06-22 NOTE — HOSPITAL COURSE
Admitted. No surgery. Pneumothorax stable on repeat imaging. Rib Fx are non-displaced. No intervention from ENT for orbital fracture. Pain controlled. On RA. OK for home. F/u in two weeks with CXR.

## 2020-06-22 NOTE — NURSING
Nurse instructed on  copy of discharged papers instructions. Pt denies pain at this time. Pt educated on signs and symptoms of when to call the doctor. All belongings with the patient . Pt verbalized understanding.
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
## 2020-06-22 NOTE — SUBJECTIVE & OBJECTIVE
Interval History: No acute events. PTX stable on repeat CXR.     Medications:  Continuous Infusions:   lactated ringers 100 mL/hr at 06/22/20 0940     Scheduled Meds:   acetaminophen  650 mg Oral Q8H    gabapentin  300 mg Oral TID     PRN Meds:acetaminophen, HYDROmorphone, metoclopramide HCl, ondansetron, oxyCODONE, oxyCODONE, sodium chloride 0.9%     Review of patient's allergies indicates:   Allergen Reactions    Tramadol Hives    Poison ivy [vit e-nonoxynol 9-aloe vera]      Objective:     Vital Signs (Most Recent):  Temp: 96.7 °F (35.9 °C) (06/22/20 0751)  Pulse: 81 (06/22/20 0755)  Resp: 16 (06/22/20 0755)  BP: 131/87 (06/22/20 0751)  SpO2: 98 % (06/22/20 0755) Vital Signs (24h Range):  Temp:  [96.7 °F (35.9 °C)-98.6 °F (37 °C)] 96.7 °F (35.9 °C)  Pulse:  [55-91] 81  Resp:  [14-24] 16  SpO2:  [98 %-100 %] 98 %  BP: (128-166)/(72-90) 131/87     Weight: 61.5 kg (135 lb 9.3 oz)  Body mass index is 18.39 kg/m².    Intake/Output - Last 3 Shifts       06/20 0700 - 06/21 0659 06/21 0700 - 06/22 0659 06/22 0700 - 06/23 0659    P.O.   250    IV Piggyback  1050     Total Intake(mL/kg)  1050 (17.1) 250 (4.1)    Urine (mL/kg/hr)   775 (3.5)    Total Output   775    Net  +1050 -525                 Physical Exam  Constitutional:       General: He is not in acute distress.     Appearance: He is well-developed.   HENT:      Head: Normocephalic.      Comments: Tender to palpation of R face  R periorbital swelling and ecchymosis  Small abrasions to forehead     Mouth/Throat:      Mouth: Mucous membranes are dry.   Eyes:      General:         Right eye: No discharge.         Left eye: No discharge.      Extraocular Movements: Extraocular movements intact.   Neck:      Musculoskeletal: Normal range of motion and neck supple.   Cardiovascular:      Rate and Rhythm: Normal rate and regular rhythm.   Pulmonary:      Effort: No respiratory distress.      Breath sounds: No stridor. No wheezing or rales.   Chest:      Chest wall:  Tenderness (Left chest) present.   Abdominal:      General: There is no distension.      Palpations: Abdomen is soft.      Tenderness: There is no abdominal tenderness.   Musculoskeletal: Normal range of motion.         General: No deformity.   Skin:     General: Skin is warm and dry.      Findings: Bruising (R periorbital) present.   Neurological:      General: No focal deficit present.      Mental Status: He is alert and oriented to person, place, and time.      Comments: Intact sensation and motor function all four extremities   Psychiatric:         Mood and Affect: Mood normal.         Behavior: Behavior normal.         Significant Labs:  CBC:   Recent Labs   Lab 06/22/20  0321   WBC 9.93   RBC 4.92   HGB 14.6   HCT 44.0      MCV 89   MCH 29.7   MCHC 33.2     CMP:   Recent Labs   Lab 06/22/20  0321   GLU 75   CALCIUM 8.6*   ALBUMIN 3.6   PROT 6.1      K 4.1   CO2 26      BUN 13   CREATININE 0.8   ALKPHOS 90   ALT 17   AST 34   BILITOT 1.0       Significant Diagnostics:  I have reviewed all pertinent imaging results/findings within the past 24 hours.   CXR shows stable PTX
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
## 2020-06-23 ENCOUNTER — PATIENT OUTREACH (OUTPATIENT)
Dept: ADMINISTRATIVE | Facility: CLINIC | Age: 40
End: 2020-06-23

## 2020-06-25 LAB
BACTERIA BLD CULT: NORMAL
BACTERIA BLD CULT: NORMAL

## 2020-07-07 ENCOUNTER — OFFICE VISIT (OUTPATIENT)
Dept: SURGERY | Facility: CLINIC | Age: 40
End: 2020-07-07
Payer: MEDICAID

## 2020-07-07 VITALS
DIASTOLIC BLOOD PRESSURE: 86 MMHG | HEART RATE: 72 BPM | TEMPERATURE: 98 F | SYSTOLIC BLOOD PRESSURE: 131 MMHG | OXYGEN SATURATION: 100 %

## 2020-07-07 DIAGNOSIS — S22.42XD CLOSED FRACTURE OF MULTIPLE RIBS OF LEFT SIDE WITH ROUTINE HEALING, SUBSEQUENT ENCOUNTER: Primary | ICD-10-CM

## 2020-07-07 PROCEDURE — 99999 PR PBB SHADOW E&M-EST. PATIENT-LVL III: ICD-10-PCS | Mod: PBBFAC,,, | Performed by: SURGERY

## 2020-07-07 PROCEDURE — 99999 PR PBB SHADOW E&M-EST. PATIENT-LVL III: CPT | Mod: PBBFAC,,, | Performed by: SURGERY

## 2020-07-07 PROCEDURE — 99213 OFFICE O/P EST LOW 20 MIN: CPT | Mod: PBBFAC | Performed by: SURGERY

## 2020-07-07 PROCEDURE — 99213 OFFICE O/P EST LOW 20 MIN: CPT | Mod: S$PBB,,, | Performed by: SURGERY

## 2020-07-07 PROCEDURE — 99213 PR OFFICE/OUTPT VISIT, EST, LEVL III, 20-29 MIN: ICD-10-PCS | Mod: S$PBB,,, | Performed by: SURGERY

## 2020-07-07 RX ORDER — MELOXICAM 15 MG/1
15 TABLET ORAL DAILY
Qty: 6 TABLET | Refills: 0 | Status: SHIPPED | OUTPATIENT
Start: 2020-07-07 | End: 2020-07-13
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
